# Patient Record
Sex: FEMALE | Race: WHITE | NOT HISPANIC OR LATINO
[De-identification: names, ages, dates, MRNs, and addresses within clinical notes are randomized per-mention and may not be internally consistent; named-entity substitution may affect disease eponyms.]

---

## 2017-03-13 PROBLEM — Z00.00 ENCOUNTER FOR PREVENTIVE HEALTH EXAMINATION: Status: ACTIVE | Noted: 2017-03-13

## 2017-03-21 ENCOUNTER — APPOINTMENT (OUTPATIENT)
Dept: DERMATOLOGY | Facility: CLINIC | Age: 57
End: 2017-03-21

## 2017-03-21 DIAGNOSIS — Z87.39 PERSONAL HISTORY OF OTHER DISEASES OF THE MUSCULOSKELETAL SYSTEM AND CONNECTIVE TISSUE: ICD-10-CM

## 2017-03-21 DIAGNOSIS — Z86.69 PERSONAL HISTORY OF OTHER DISEASES OF THE NERVOUS SYSTEM AND SENSE ORGANS: ICD-10-CM

## 2017-03-21 DIAGNOSIS — L50.8 OTHER URTICARIA: ICD-10-CM

## 2017-03-21 RX ORDER — RIZATRIPTAN BENZOATE 10 MG/1
10 TABLET ORAL
Refills: 0 | Status: ACTIVE | COMMUNITY

## 2017-03-21 RX ORDER — ESCITALOPRAM OXALATE 5 MG/1
TABLET, FILM COATED ORAL
Refills: 0 | Status: ACTIVE | COMMUNITY

## 2017-03-21 RX ORDER — ASPIRIN 81 MG
81 TABLET, DELAYED RELEASE (ENTERIC COATED) ORAL
Refills: 0 | Status: ACTIVE | COMMUNITY

## 2017-03-21 RX ORDER — FEXOFENADINE HYDROCHLORIDE 180 MG/1
180 TABLET, FILM COATED ORAL
Refills: 0 | Status: ACTIVE | COMMUNITY

## 2017-03-21 RX ORDER — PANTOPRAZOLE SODIUM 40 MG/10ML
40 INJECTION, POWDER, FOR SOLUTION INTRAVENOUS
Refills: 0 | Status: ACTIVE | COMMUNITY

## 2017-03-21 RX ORDER — ATORVASTATIN CALCIUM 10 MG/1
10 TABLET, FILM COATED ORAL
Refills: 0 | Status: ACTIVE | COMMUNITY

## 2018-10-01 ENCOUNTER — EMERGENCY (EMERGENCY)
Facility: HOSPITAL | Age: 58
LOS: 0 days | Discharge: HOME | End: 2018-10-01
Attending: EMERGENCY MEDICINE
Payer: COMMERCIAL

## 2018-10-01 VITALS
RESPIRATION RATE: 16 BRPM | OXYGEN SATURATION: 100 % | DIASTOLIC BLOOD PRESSURE: 79 MMHG | HEART RATE: 63 BPM | SYSTOLIC BLOOD PRESSURE: 130 MMHG | TEMPERATURE: 98 F

## 2018-10-01 VITALS
RESPIRATION RATE: 16 BRPM | WEIGHT: 130.07 LBS | TEMPERATURE: 99 F | OXYGEN SATURATION: 97 % | HEART RATE: 64 BPM | DIASTOLIC BLOOD PRESSURE: 95 MMHG | HEIGHT: 61 IN | SYSTOLIC BLOOD PRESSURE: 170 MMHG

## 2018-10-01 DIAGNOSIS — R51 HEADACHE: ICD-10-CM

## 2018-10-01 DIAGNOSIS — G43.909 MIGRAINE, UNSPECIFIED, NOT INTRACTABLE, WITHOUT STATUS MIGRAINOSUS: ICD-10-CM

## 2018-10-01 PROCEDURE — 99284 EMERGENCY DEPT VISIT MOD MDM: CPT

## 2018-10-01 PROCEDURE — 70450 CT HEAD/BRAIN W/O DYE: CPT | Mod: 26

## 2018-10-01 RX ORDER — DIPHENHYDRAMINE HCL 50 MG
25 CAPSULE ORAL ONCE
Qty: 0 | Refills: 0 | Status: COMPLETED | OUTPATIENT
Start: 2018-10-01 | End: 2018-10-01

## 2018-10-01 RX ORDER — KETOROLAC TROMETHAMINE 30 MG/ML
30 SYRINGE (ML) INJECTION ONCE
Qty: 0 | Refills: 0 | Status: DISCONTINUED | OUTPATIENT
Start: 2018-10-01 | End: 2018-10-01

## 2018-10-01 RX ORDER — SODIUM CHLORIDE 9 MG/ML
1000 INJECTION INTRAMUSCULAR; INTRAVENOUS; SUBCUTANEOUS ONCE
Qty: 0 | Refills: 0 | Status: COMPLETED | OUTPATIENT
Start: 2018-10-01 | End: 2018-10-01

## 2018-10-01 RX ORDER — ACETAMINOPHEN 500 MG
1000 TABLET ORAL ONCE
Qty: 0 | Refills: 0 | Status: COMPLETED | OUTPATIENT
Start: 2018-10-01 | End: 2018-10-01

## 2018-10-01 RX ORDER — METOCLOPRAMIDE HCL 10 MG
10 TABLET ORAL ONCE
Qty: 0 | Refills: 0 | Status: COMPLETED | OUTPATIENT
Start: 2018-10-01 | End: 2018-10-01

## 2018-10-01 RX ADMIN — Medication 10 MILLIGRAM(S): at 12:17

## 2018-10-01 RX ADMIN — Medication 30 MILLIGRAM(S): at 14:21

## 2018-10-01 RX ADMIN — SODIUM CHLORIDE 1000 MILLILITER(S): 9 INJECTION INTRAMUSCULAR; INTRAVENOUS; SUBCUTANEOUS at 12:17

## 2018-10-01 RX ADMIN — Medication 1000 MILLIGRAM(S): at 14:36

## 2018-10-01 RX ADMIN — Medication 400 MILLIGRAM(S): at 14:21

## 2018-10-01 RX ADMIN — Medication 1000 MILLIGRAM(S): at 14:51

## 2018-10-01 RX ADMIN — SODIUM CHLORIDE 1000 MILLILITER(S): 9 INJECTION INTRAMUSCULAR; INTRAVENOUS; SUBCUTANEOUS at 13:17

## 2018-10-01 RX ADMIN — Medication 25 MILLIGRAM(S): at 12:17

## 2018-10-01 RX ADMIN — Medication 30 MILLIGRAM(S): at 14:51

## 2018-10-01 NOTE — ED STATDOCS - MEDICAL DECISION MAKING DETAILS
59 y/o F with hx of Migraines presenting with HA, different than previous migraines. Will give IV fluids, get CT Head, reassess.

## 2018-10-01 NOTE — ED ADULT NURSE NOTE - NSIMPLEMENTINTERV_GEN_ALL_ED
Implemented All Universal Safety Interventions:  [place] to call system. Call bell, personal items and telephone within reach. Instruct patient to call for assistance. Room bathroom lighting operational. Non-slip footwear when patient is off stretcher. Physically safe environment: no spills, clutter or unnecessary equipment. Stretcher in lowest position, wheels locked, appropriate side rails in place.

## 2018-10-01 NOTE — ED ADULT NURSE NOTE - OBJECTIVE STATEMENT
pt p/w head pain "migraine like" worsening over the last few days. Pt was at her sisters  today when it began to get worse, she has hx of migraines. Pt has not eaten or drank much over the last few days and has been suffering from intermittent migraine like pain. Pt states the pain is moving from front to back.

## 2018-10-01 NOTE — ED STATDOCS - OBJECTIVE STATEMENT
59 y/o F with PMHx of Migraines presenting to the ED with  c/o sudden onset HA x3 days, started while pt was at her sister's . HA more severe and more diffuse across head than typical migraines. +photophobia. Took Maxalt without relief. No fevers, chills, vomiting, CP, SOB, or abd pain. Allergic to Penicillin.

## 2018-10-01 NOTE — ED STATDOCS - ATTENDING CONTRIBUTION TO CARE
I, Melanie Christina MD, personally saw the patient with ACP.  I have personally performed a face to face diagnostic evaluation on this patient.  I have reviewed the ACP note and agree with the history, exam, and plan of care, except as noted.

## 2018-10-01 NOTE — ED STATDOCS - PROGRESS NOTE DETAILS
57 y/o F with PMH of migraines presents with HA x 3 days. Notes she was her sister's  when symptoms started. States HA is unlike prior migraines. Typical migraines are frontal/left side which extend to eye and left sinus. Current HA is occipital and wraps around bilateral temporal area. +photophobia. States she took Maxalt at apx 10AM with no relief. Denies recent fever, chills, nausea, vomiting, CP, SOB, numbness/tingling in extremities. PE: Pt sitting in dark room with sunglasses, appears uncomfortable. Neuro: CN II-XII intact, Sensation intact to light touch in all extremities. 5 57 y/o F with PMH of migraines presents with HA x 3 days. Notes she was her sister's  when symptoms started. States HA is unlike prior migraines. Typical migraines are frontal/left side which extend to eye and left sinus. Current HA is occipital and wraps around bilateral temporal area. +photophobia. States she took Maxalt at apx 10AM with no relief. Denies recent fever, chills, nausea, vomiting, CP, SOB, numbness/tingling in extremities. PE: Pt sitting in dark room with sunglasses, appears uncomfortable. Neuro: CN II-XII intact, Sensation intact to light touch in all extremities. 5/5 upper and lower extremity strength. Cardiac: s1/s2, RRR. Lungs: CTAB. Abdomen: NBS x4, soft, nontender. A/P: atypical migraine. Analgesia and CT head. Reassess. - Shawn Peraza PA-C CT report pending. Pt notes improvement in HA of "1 point" since arrival. Offered PO fioricet, pt requesting IV medication. Offered IV tylenol, which patient agrees. Will order and reassess. - Shawn Peraza PA-C Head CT reviewed as unremarkable. - Shawn Peraza PA-C Pt notes improvement in HA following administration of tylenol. Plan to D/C home. - Shawn Peraza PA-C

## 2018-10-05 ENCOUNTER — APPOINTMENT (OUTPATIENT)
Dept: NEUROLOGY | Facility: CLINIC | Age: 58
End: 2018-10-05
Payer: COMMERCIAL

## 2018-10-05 VITALS
HEART RATE: 84 BPM | DIASTOLIC BLOOD PRESSURE: 78 MMHG | SYSTOLIC BLOOD PRESSURE: 128 MMHG | BODY MASS INDEX: 24.55 KG/M2 | HEIGHT: 61 IN | WEIGHT: 130 LBS

## 2018-10-05 DIAGNOSIS — G43.901 MIGRAINE, UNSPECIFIED, NOT INTRACTABLE, WITH STATUS MIGRAINOSUS: ICD-10-CM

## 2018-10-05 DIAGNOSIS — G43.009 MIGRAINE W/OUT AURA, NOT INTRACTABLE, W/OUT STATUS MIGRAINOSUS: ICD-10-CM

## 2018-10-05 DIAGNOSIS — F41.9 ANXIETY DISORDER, UNSPECIFIED: ICD-10-CM

## 2018-10-05 PROCEDURE — 99244 OFF/OP CNSLTJ NEW/EST MOD 40: CPT

## 2018-10-05 RX ORDER — RIZATRIPTAN BENZOATE 10 MG/1
10 TABLET, ORALLY DISINTEGRATING ORAL
Qty: 9 | Refills: 0 | Status: ACTIVE | COMMUNITY
Start: 2018-07-30

## 2018-10-05 RX ORDER — ESCITALOPRAM OXALATE 10 MG/1
10 TABLET ORAL
Qty: 30 | Refills: 0 | Status: ACTIVE | COMMUNITY
Start: 2018-09-04

## 2018-10-05 RX ORDER — PANTOPRAZOLE 40 MG/1
40 TABLET, DELAYED RELEASE ORAL
Qty: 30 | Refills: 0 | Status: ACTIVE | COMMUNITY
Start: 2018-08-12

## 2018-10-05 RX ORDER — ATORVASTATIN CALCIUM 20 MG/1
20 TABLET, FILM COATED ORAL
Qty: 30 | Refills: 0 | Status: ACTIVE | COMMUNITY
Start: 2018-09-01

## 2018-10-05 RX ORDER — METHYLPREDNISOLONE 4 MG/1
4 TABLET ORAL
Qty: 1 | Refills: 0 | Status: ACTIVE | COMMUNITY
Start: 2018-10-05 | End: 1900-01-01

## 2020-12-21 ENCOUNTER — TRANSCRIPTION ENCOUNTER (OUTPATIENT)
Age: 60
End: 2020-12-21